# Patient Record
Sex: FEMALE | Race: ASIAN | NOT HISPANIC OR LATINO
[De-identification: names, ages, dates, MRNs, and addresses within clinical notes are randomized per-mention and may not be internally consistent; named-entity substitution may affect disease eponyms.]

---

## 2023-03-14 ENCOUNTER — TRANSCRIPTION ENCOUNTER (OUTPATIENT)
Age: 23
End: 2023-03-14

## 2023-03-14 ENCOUNTER — INPATIENT (INPATIENT)
Facility: HOSPITAL | Age: 23
LOS: 0 days | Discharge: HOME | DRG: 316 | End: 2023-03-14
Attending: HOSPITALIST | Admitting: HOSPITALIST
Payer: COMMERCIAL

## 2023-03-14 VITALS
SYSTOLIC BLOOD PRESSURE: 119 MMHG | WEIGHT: 149.03 LBS | RESPIRATION RATE: 16 BRPM | TEMPERATURE: 98 F | HEART RATE: 87 BPM | DIASTOLIC BLOOD PRESSURE: 74 MMHG | OXYGEN SATURATION: 100 % | HEIGHT: 67 IN

## 2023-03-14 VITALS
HEART RATE: 62 BPM | DIASTOLIC BLOOD PRESSURE: 55 MMHG | SYSTOLIC BLOOD PRESSURE: 108 MMHG | RESPIRATION RATE: 18 BRPM | OXYGEN SATURATION: 96 %

## 2023-03-14 DIAGNOSIS — R07.9 CHEST PAIN, UNSPECIFIED: ICD-10-CM

## 2023-03-14 LAB
A1C WITH ESTIMATED AVERAGE GLUCOSE RESULT: 5.7 % — HIGH (ref 4–5.6)
ALBUMIN SERPL ELPH-MCNC: 4 G/DL — SIGNIFICANT CHANGE UP (ref 3.4–5)
ALBUMIN SERPL ELPH-MCNC: 4.5 G/DL — SIGNIFICANT CHANGE UP (ref 3.3–5)
ALP SERPL-CCNC: 43 U/L — SIGNIFICANT CHANGE UP (ref 40–120)
ALP SERPL-CCNC: 51 U/L — SIGNIFICANT CHANGE UP (ref 40–120)
ALT FLD-CCNC: 15 U/L — SIGNIFICANT CHANGE UP (ref 10–45)
ALT FLD-CCNC: 24 U/L — SIGNIFICANT CHANGE UP (ref 12–42)
ANION GAP SERPL CALC-SCNC: 11 MMOL/L — SIGNIFICANT CHANGE UP (ref 5–17)
ANION GAP SERPL CALC-SCNC: 6 MMOL/L — LOW (ref 9–16)
AST SERPL-CCNC: 23 U/L — SIGNIFICANT CHANGE UP (ref 10–40)
AST SERPL-CCNC: 25 U/L — SIGNIFICANT CHANGE UP (ref 15–37)
BASOPHILS # BLD AUTO: 0.02 K/UL — SIGNIFICANT CHANGE UP (ref 0–0.2)
BASOPHILS NFR BLD AUTO: 0.3 % — SIGNIFICANT CHANGE UP (ref 0–2)
BILIRUB SERPL-MCNC: 0.3 MG/DL — SIGNIFICANT CHANGE UP (ref 0.2–1.2)
BILIRUB SERPL-MCNC: 0.4 MG/DL — SIGNIFICANT CHANGE UP (ref 0.2–1.2)
BUN SERPL-MCNC: 10 MG/DL — SIGNIFICANT CHANGE UP (ref 7–23)
BUN SERPL-MCNC: 8 MG/DL — SIGNIFICANT CHANGE UP (ref 7–23)
CALCIUM SERPL-MCNC: 9 MG/DL — SIGNIFICANT CHANGE UP (ref 8.5–10.5)
CALCIUM SERPL-MCNC: 9.6 MG/DL — SIGNIFICANT CHANGE UP (ref 8.4–10.5)
CHLORIDE SERPL-SCNC: 106 MMOL/L — SIGNIFICANT CHANGE UP (ref 96–108)
CHLORIDE SERPL-SCNC: 106 MMOL/L — SIGNIFICANT CHANGE UP (ref 96–108)
CHOLEST SERPL-MCNC: 194 MG/DL — SIGNIFICANT CHANGE UP
CK MB CFR SERPL CALC: 2.4 NG/ML — SIGNIFICANT CHANGE UP (ref 0–6.7)
CK SERPL-CCNC: 166 U/L — SIGNIFICANT CHANGE UP (ref 25–170)
CK SERPL-CCNC: 228 U/L — HIGH (ref 26–192)
CO2 SERPL-SCNC: 22 MMOL/L — SIGNIFICANT CHANGE UP (ref 22–31)
CO2 SERPL-SCNC: 28 MMOL/L — SIGNIFICANT CHANGE UP (ref 22–31)
CREAT SERPL-MCNC: 0.71 MG/DL — SIGNIFICANT CHANGE UP (ref 0.5–1.3)
CREAT SERPL-MCNC: 0.81 MG/DL — SIGNIFICANT CHANGE UP (ref 0.5–1.3)
CRP SERPL-MCNC: <3 MG/L — SIGNIFICANT CHANGE UP (ref 0–4)
D DIMER BLD IA.RAPID-MCNC: 332 NG/ML DDU — HIGH
EGFR: 106 ML/MIN/1.73M2 — SIGNIFICANT CHANGE UP
EGFR: 124 ML/MIN/1.73M2 — SIGNIFICANT CHANGE UP
EOSINOPHIL # BLD AUTO: 0.07 K/UL — SIGNIFICANT CHANGE UP (ref 0–0.5)
EOSINOPHIL NFR BLD AUTO: 1.2 % — SIGNIFICANT CHANGE UP (ref 0–6)
ERYTHROCYTE [SEDIMENTATION RATE] IN BLOOD: 9 MM/HR — SIGNIFICANT CHANGE UP
ESTIMATED AVERAGE GLUCOSE: 117 MG/DL — HIGH (ref 68–114)
GLUCOSE SERPL-MCNC: 108 MG/DL — HIGH (ref 70–99)
GLUCOSE SERPL-MCNC: 135 MG/DL — HIGH (ref 70–99)
HCG SERPL-ACNC: <1 MIU/ML — SIGNIFICANT CHANGE UP
HCT VFR BLD CALC: 38.1 % — SIGNIFICANT CHANGE UP (ref 34.5–45)
HCT VFR BLD CALC: 38.4 % — SIGNIFICANT CHANGE UP (ref 34.5–45)
HDLC SERPL-MCNC: 89 MG/DL — SIGNIFICANT CHANGE UP
HGB BLD-MCNC: 12.4 G/DL — SIGNIFICANT CHANGE UP (ref 11.5–15.5)
HGB BLD-MCNC: 12.5 G/DL — SIGNIFICANT CHANGE UP (ref 11.5–15.5)
HIV 1 & 2 AB SERPL IA.RAPID: SIGNIFICANT CHANGE UP
IMM GRANULOCYTES NFR BLD AUTO: 0 % — SIGNIFICANT CHANGE UP (ref 0–0.9)
LIPID PNL WITH DIRECT LDL SERPL: 97 MG/DL — SIGNIFICANT CHANGE UP
LYMPHOCYTES # BLD AUTO: 2.41 K/UL — SIGNIFICANT CHANGE UP (ref 1–3.3)
LYMPHOCYTES # BLD AUTO: 40.8 % — SIGNIFICANT CHANGE UP (ref 13–44)
MAGNESIUM SERPL-MCNC: 2 MG/DL — SIGNIFICANT CHANGE UP (ref 1.6–2.6)
MCHC RBC-ENTMCNC: 29.6 PG — SIGNIFICANT CHANGE UP (ref 27–34)
MCHC RBC-ENTMCNC: 29.6 PG — SIGNIFICANT CHANGE UP (ref 27–34)
MCHC RBC-ENTMCNC: 32.3 GM/DL — SIGNIFICANT CHANGE UP (ref 32–36)
MCHC RBC-ENTMCNC: 32.8 GM/DL — SIGNIFICANT CHANGE UP (ref 32–36)
MCV RBC AUTO: 90.3 FL — SIGNIFICANT CHANGE UP (ref 80–100)
MCV RBC AUTO: 91.6 FL — SIGNIFICANT CHANGE UP (ref 80–100)
MONOCYTES # BLD AUTO: 0.35 K/UL — SIGNIFICANT CHANGE UP (ref 0–0.9)
MONOCYTES NFR BLD AUTO: 5.9 % — SIGNIFICANT CHANGE UP (ref 2–14)
NEUTROPHILS # BLD AUTO: 3.05 K/UL — SIGNIFICANT CHANGE UP (ref 1.8–7.4)
NEUTROPHILS NFR BLD AUTO: 51.8 % — SIGNIFICANT CHANGE UP (ref 43–77)
NON HDL CHOLESTEROL: 105 MG/DL — SIGNIFICANT CHANGE UP
NRBC # BLD: 0 /100 WBCS — SIGNIFICANT CHANGE UP (ref 0–0)
NRBC # BLD: 0 /100 WBCS — SIGNIFICANT CHANGE UP (ref 0–0)
PLATELET # BLD AUTO: 168 K/UL — SIGNIFICANT CHANGE UP (ref 150–400)
PLATELET # BLD AUTO: 187 K/UL — SIGNIFICANT CHANGE UP (ref 150–400)
POTASSIUM SERPL-MCNC: 3.8 MMOL/L — SIGNIFICANT CHANGE UP (ref 3.5–5.3)
POTASSIUM SERPL-MCNC: 4.3 MMOL/L — SIGNIFICANT CHANGE UP (ref 3.5–5.3)
POTASSIUM SERPL-SCNC: 3.8 MMOL/L — SIGNIFICANT CHANGE UP (ref 3.5–5.3)
POTASSIUM SERPL-SCNC: 4.3 MMOL/L — SIGNIFICANT CHANGE UP (ref 3.5–5.3)
PROT SERPL-MCNC: 7.6 G/DL — SIGNIFICANT CHANGE UP (ref 6–8.3)
PROT SERPL-MCNC: 7.7 G/DL — SIGNIFICANT CHANGE UP (ref 6.4–8.2)
RBC # BLD: 4.19 M/UL — SIGNIFICANT CHANGE UP (ref 3.8–5.2)
RBC # BLD: 4.22 M/UL — SIGNIFICANT CHANGE UP (ref 3.8–5.2)
RBC # FLD: 12.8 % — SIGNIFICANT CHANGE UP (ref 10.3–14.5)
RBC # FLD: 13 % — SIGNIFICANT CHANGE UP (ref 10.3–14.5)
SARS-COV-2 RNA SPEC QL NAA+PROBE: SIGNIFICANT CHANGE UP
SODIUM SERPL-SCNC: 139 MMOL/L — SIGNIFICANT CHANGE UP (ref 135–145)
SODIUM SERPL-SCNC: 140 MMOL/L — SIGNIFICANT CHANGE UP (ref 132–145)
T4 AB SER-ACNC: 6.24 UG/DL — SIGNIFICANT CHANGE UP (ref 4.5–11.7)
TRIGL SERPL-MCNC: 40 MG/DL — SIGNIFICANT CHANGE UP
TROPONIN I, HIGH SENSITIVITY RESULT: 151.9 NG/L — HIGH
TROPONIN T SERPL-MCNC: <0.01 NG/ML — SIGNIFICANT CHANGE UP (ref 0–0.01)
TSH SERPL-MCNC: 2.79 UIU/ML — SIGNIFICANT CHANGE UP (ref 0.27–4.2)
WBC # BLD: 5.9 K/UL — SIGNIFICANT CHANGE UP (ref 3.8–10.5)
WBC # BLD: 6.87 K/UL — SIGNIFICANT CHANGE UP (ref 3.8–10.5)
WBC # FLD AUTO: 5.9 K/UL — SIGNIFICANT CHANGE UP (ref 3.8–10.5)
WBC # FLD AUTO: 6.87 K/UL — SIGNIFICANT CHANGE UP (ref 3.8–10.5)

## 2023-03-14 PROCEDURE — 71275 CT ANGIOGRAPHY CHEST: CPT | Mod: 26

## 2023-03-14 PROCEDURE — 83735 ASSAY OF MAGNESIUM: CPT

## 2023-03-14 PROCEDURE — 75574 CT ANGIO HRT W/3D IMAGE: CPT

## 2023-03-14 PROCEDURE — 71045 X-RAY EXAM CHEST 1 VIEW: CPT | Mod: 26

## 2023-03-14 PROCEDURE — 84443 ASSAY THYROID STIM HORMONE: CPT

## 2023-03-14 PROCEDURE — 87635 SARS-COV-2 COVID-19 AMP PRB: CPT

## 2023-03-14 PROCEDURE — 84436 ASSAY OF TOTAL THYROXINE: CPT

## 2023-03-14 PROCEDURE — 75574 CT ANGIO HRT W/3D IMAGE: CPT | Mod: 26

## 2023-03-14 PROCEDURE — 93010 ELECTROCARDIOGRAM REPORT: CPT

## 2023-03-14 PROCEDURE — 82553 CREATINE MB FRACTION: CPT

## 2023-03-14 PROCEDURE — 84702 CHORIONIC GONADOTROPIN TEST: CPT

## 2023-03-14 PROCEDURE — 36415 COLL VENOUS BLD VENIPUNCTURE: CPT

## 2023-03-14 PROCEDURE — 93306 TTE W/DOPPLER COMPLETE: CPT

## 2023-03-14 PROCEDURE — 71275 CT ANGIOGRAPHY CHEST: CPT

## 2023-03-14 PROCEDURE — 93306 TTE W/DOPPLER COMPLETE: CPT | Mod: 26

## 2023-03-14 PROCEDURE — 85025 COMPLETE CBC W/AUTO DIFF WBC: CPT

## 2023-03-14 PROCEDURE — 80053 COMPREHEN METABOLIC PANEL: CPT

## 2023-03-14 PROCEDURE — 82550 ASSAY OF CK (CPK): CPT

## 2023-03-14 PROCEDURE — 86140 C-REACTIVE PROTEIN: CPT

## 2023-03-14 PROCEDURE — 85652 RBC SED RATE AUTOMATED: CPT

## 2023-03-14 PROCEDURE — 99223 1ST HOSP IP/OBS HIGH 75: CPT

## 2023-03-14 PROCEDURE — 99285 EMERGENCY DEPT VISIT HI MDM: CPT

## 2023-03-14 PROCEDURE — 83036 HEMOGLOBIN GLYCOSYLATED A1C: CPT

## 2023-03-14 PROCEDURE — 93005 ELECTROCARDIOGRAM TRACING: CPT

## 2023-03-14 PROCEDURE — 71045 X-RAY EXAM CHEST 1 VIEW: CPT

## 2023-03-14 PROCEDURE — 84484 ASSAY OF TROPONIN QUANT: CPT

## 2023-03-14 PROCEDURE — 85027 COMPLETE CBC AUTOMATED: CPT

## 2023-03-14 PROCEDURE — 85379 FIBRIN DEGRADATION QUANT: CPT

## 2023-03-14 PROCEDURE — 80061 LIPID PANEL: CPT

## 2023-03-14 PROCEDURE — 86703 HIV-1/HIV-2 1 RESULT ANTBDY: CPT

## 2023-03-14 RX ORDER — COLCHICINE 0.6 MG
0.6 TABLET ORAL
Refills: 0 | Status: DISCONTINUED | OUTPATIENT
Start: 2023-03-14 | End: 2023-03-14

## 2023-03-14 RX ORDER — METOPROLOL TARTRATE 50 MG
50 TABLET ORAL ONCE
Refills: 0 | Status: COMPLETED | OUTPATIENT
Start: 2023-03-14 | End: 2023-03-14

## 2023-03-14 RX ORDER — IBUPROFEN 200 MG
600 TABLET ORAL THREE TIMES A DAY
Refills: 0 | Status: DISCONTINUED | OUTPATIENT
Start: 2023-03-14 | End: 2023-03-14

## 2023-03-14 RX ORDER — INFLUENZA VIRUS VACCINE 15; 15; 15; 15 UG/.5ML; UG/.5ML; UG/.5ML; UG/.5ML
0.5 SUSPENSION INTRAMUSCULAR ONCE
Refills: 0 | Status: DISCONTINUED | OUTPATIENT
Start: 2023-03-14 | End: 2023-03-14

## 2023-03-14 RX ORDER — COLCHICINE 0.6 MG
1 TABLET ORAL
Qty: 60 | Refills: 0
Start: 2023-03-14 | End: 2023-04-12

## 2023-03-14 RX ORDER — PANTOPRAZOLE SODIUM 20 MG/1
1 TABLET, DELAYED RELEASE ORAL
Qty: 30 | Refills: 0
Start: 2023-03-14 | End: 2023-04-12

## 2023-03-14 RX ORDER — PANTOPRAZOLE SODIUM 20 MG/1
40 TABLET, DELAYED RELEASE ORAL
Refills: 0 | Status: DISCONTINUED | OUTPATIENT
Start: 2023-03-14 | End: 2023-03-14

## 2023-03-14 RX ORDER — IBUPROFEN 200 MG
1 TABLET ORAL
Qty: 21 | Refills: 0
Start: 2023-03-14 | End: 2023-03-20

## 2023-03-14 RX ORDER — IBUPROFEN 200 MG
600 TABLET ORAL
Refills: 0 | Status: DISCONTINUED | OUTPATIENT
Start: 2023-03-14 | End: 2023-03-14

## 2023-03-14 RX ADMIN — PANTOPRAZOLE SODIUM 40 MILLIGRAM(S): 20 TABLET, DELAYED RELEASE ORAL at 14:05

## 2023-03-14 RX ADMIN — Medication 600 MILLIGRAM(S): at 14:11

## 2023-03-14 RX ADMIN — Medication 50 MILLIGRAM(S): at 09:17

## 2023-03-14 NOTE — ED PROVIDER NOTE - CLINICAL SUMMARY MEDICAL DECISION MAKING FREE TEXT BOX
Patient with chest pain, abnormal EKG. Nature of pain not c/w ACS. However, given abnormal EKG, will check troponin, d-dimer, CXR, reassess.

## 2023-03-14 NOTE — ED PROVIDER NOTE - NS ED ROS FT
Gen: Well-developed, NAD. HEENT: NCAT, mmm   Chest: RRR, nl S1 and S2, no m/r/g. Resp: CTAB, no w/r/r  Abd: nl BS, soft, nt/nd. Ext: Warm, dry  Neuro: CN II-XII intact, normal and equal strength, sensation, and reflexes bilaterally, normal gait  Psych: AAOx3

## 2023-03-14 NOTE — DISCHARGE NOTE NURSING/CASE MANAGEMENT/SOCIAL WORK - NSDCPEFALRISK_GEN_ALL_CORE
For information on Fall & Injury Prevention, visit: [email or web address] OR  [email or web address] OR  https://www.cdc.gov/steadi/patient.html

## 2023-03-14 NOTE — DISCHARGE NOTE PROVIDER - NSDCMRMEDTOKEN_GEN_ALL_CORE_FT
colchicine 0.6 mg oral tablet: 1 tab(s) orally 2 times a day  ibuprofen 600 mg oral tablet: 1 tab(s) orally 3 times a day  pantoprazole 40 mg oral delayed release tablet: 1 tab(s) orally once a day (before a meal)

## 2023-03-14 NOTE — ED PROVIDER NOTE - PROGRESS NOTE DETAILS
Elevated d-dimer. Will get CTA chest. Spoke with Dr. Siu from cardiology. accepted patient to his service.

## 2023-03-14 NOTE — H&P ADULT - NSHPLABSRESULTS_GEN_ALL_CORE
12.5   6.87  )-----------( 187      ( 14 Mar 2023 09:19 )             38.1       03-14    140  |  106  |  10  ----------------------------<  135<H>  4.3   |  28  |  0.81    Ca    9.0      14 Mar 2023 00:27    TPro  7.7  /  Alb  4.0  /  TBili  0.3  /  DBili  x   /  AST  25  /  ALT  24  /  AlkPhos  51  03-14          CARDIAC MARKERS ( 14 Mar 2023 01:41 )  x     / x     / 228 U/L / x     / x                EKG: NSR, T inversions V1-V5.

## 2023-03-14 NOTE — ED PROVIDER NOTE - OBJECTIVE STATEMENT
Patient reports that she developed left-sided chest pain below her breast around 8 PM tonight.  Feels like cramping.  No change with exertion.  Feels better with deep breath, lying back, or slouching.  Feels worse with sitting up straight.  Has had pain like this on and off in the past that usually last a few minutes and goes away with stretching but went to urgent care [facility] because the pain persisted had an abnormal EKG and was sent to the ED.  No fever, shortness of breath, abdominal pain, nausea, vomiting, diarrhea, diaphoresis.  Patient reports she had palpitations when she was 18 years old.  Saw a cardiologist who did a 48-hour Holter monitor and an echocardiogram.  Told that everything was normal.  Does not have a copy of her old EKG.

## 2023-03-14 NOTE — ED PROVIDER NOTE - CARE PLAN
Principal Discharge DX:	Chest pain   1 Principal Discharge DX:	Chest pain  Secondary Diagnosis:	Abnormal cardiac enzyme level

## 2023-03-14 NOTE — DISCHARGE NOTE PROVIDER - CARE PROVIDER_API CALL
Berna Tapia)  Cardiology  [address] [address]  Phone: [phone]  Fax: [phone]  Follow Up Time: 2 weeks

## 2023-03-14 NOTE — DISCHARGE NOTE NURSING/CASE MANAGEMENT/SOCIAL WORK - PATIENT PORTAL LINK FT
You can access the FollowMyHealth Patient Portal offered by [facility] by registering at the following website: [email or web address]. By joining [employer]’s FollowMyHealth portal, you will also be able to view your health information using other applications (apps) compatible with our system.

## 2023-03-14 NOTE — H&P ADULT - ASSESSMENT
Patient is a 21 y/o F with no significant PMHx who presented to the urgent care with left sided chest pain below her breast  x 1 day. Noted to have ECG with T inversions in V1-V5, abnormal Troponin I high sensitivity 151.9 and  at [facility] with eventual transfer to [facility] for further ischemic evaluation with CCTA and echo.

## 2023-03-14 NOTE — ED ADULT NURSE NOTE - CHIEF COMPLAINT QUOTE
female patient here for eval of left sided chest pain since 2000 tonight. patient referred here from [facility] MD for eval of T inversions in V-1,2,3,4,5. patient reports similar incident in 2022 and wore a holter monitor for PVC. patient reports heart murmur diagnosis as a child but denies any other PMH

## 2023-03-14 NOTE — DISCHARGE NOTE PROVIDER - HOSPITAL COURSE
Patient is a 23 y/o F with no significant PMHx who presented to the urgent care with left sided chest pain below her breast  x 1 day. Was reported to have abnormal EKG prompting ER visit at [facility]. On arrival VS /74, HR 87, RR 16, Temp 98.5F, O2 100% on RA, ECG with T inversions in V1-V5, abnormal Troponin I high sensitivity 151.9 and , CXR wnl. Also noted to have elevated D-dimer 332 although CTA negative study for PE.  Patient transferred to [facility] for ischemic evaluation in setting of unstable angina.    On arrival here, patient reports ongoing intermittent left sided sharp/cramp-like chest pain with severity 6/10, somewhat positional in nature, and started 8 pm last night when she went out to eat dinner. Each episode can last 5-10 seconds. Sxs worsen with sitting up and taking a deep breath and improve with laying down and stretching. At times she can improve sxs with taking a full deep breath as well. Reports she has had similar sensation of chest discomfort in the past as well (approx 3 times a year) although this episode is lasting longer which prompted her further evaluation.  Denies any syncope, presyncope, palpitations, edema, orthopnea, SOB, any recent cough/cold, or recent sick contacts.     Of note, patient states reports seeing a cardiologist in [place] 2.5 yrs ago for palpitations evaluated with holter and echo stated to be unremarkable besides PVC's.     Patient underwent echo ( ____), CCTA (____), EKG with NSR and T inversions in V1-V5 . Patient labs significant for  ____. Given her symptomatology and cardiac testing results including EKG findings, she will be treated for pericarditis with Ibuprofen 600mg po tid x 7 days, colchicine 0.6mg po bid x 1 month, protonix 40mg po qd x 7 days per MD discussion.     No significant events on telemetry. Repeat EKG without ischemic changes. Patient has been medically cleared for discharge as per Dr. Marley. Patient has been given appropriate discharge instructions including medication regimen, access site management and follow up with Dr. Tapia. Medications that patient needs refills on have been e-prescribed to preferred pharmacy.        Patient is a 21 y/o F with no significant PMHx who presented to the urgent care with left sided chest pain below her breast  x 1 day. Was reported to have abnormal EKG prompting ER visit at [facility]. On arrival VS /74, HR 87, RR 16, Temp 98.5F, O2 100% on RA, ECG with T inversions in V1-V5, abnormal Troponin I high sensitivity 151.9 and , CXR wnl. Also noted to have elevated D-dimer 332 although CTA negative study for PE.  Patient transferred to [facility] for ischemic evaluation in setting of unstable angina.    On arrival here, patient reports ongoing intermittent left sided sharp/cramp-like chest pain with severity 6/10, somewhat positional in nature, and started 8 pm last night when she went out to eat dinner. Each episode can last 5-10 seconds. Sxs worsen with sitting up and taking a deep breath and improve with laying down and stretching. At times she can improve sxs with taking a full deep breath as well. Reports she has had similar sensation of chest discomfort in the past as well (approx 3 times a year) although this episode is lasting longer which prompted her further evaluation.  Denies any syncope, presyncope, palpitations, edema, orthopnea, SOB, any recent cough/cold, or recent sick contacts.     Of note, patient states reports seeing a cardiologist in [place] 2.5 yrs ago for palpitations evaluated with holter and echo stated to be unremarkable besides PVC's.     Patient underwent echo with findings of normal LV size & function, severe asymmetric LVH which appears to extend to the apex. No ESTRELLA of MV or gradient across the LVOT gradient noted. Advised to consider alternative modality (cardiac MRI) or limited contrast study for better assessment of apex/to assess for apical hypertropic cardiomyopathy, Her CCTA stated Ca score 0, angiographically normal coronary arteries, EKG with NSR and T inversions in V1-V5 . Patient labs significant for  A1C 5.7, otherwise unremarkable this admission. Given her symptomatology and cardiac testing results including EKG findings, she will be treated for pericarditis with Ibuprofen 600mg po tid x 7 days, colchicine 0.6mg po bid x 1 month, protonix 40mg po qd x 7 days per MD discussion.     No significant events on telemetry. Repeat EKG without ischemic changes. Patient has been medically cleared for discharge as per Dr. Marley. Patient has been given appropriate discharge instructions including medication regimen, access site management and follow up with Dr. Tapia. Medications that patient needs refills on have been e-prescribed to preferred pharmacy.

## 2023-03-14 NOTE — H&P ADULT - PROBLEM SELECTOR PLAN 1
-Abnormal EKG, troponin I high sensitivity, CK, [facility]  -Etiology: ACS vs pericarditis (idiopathic vs viral)  -EKG 3/14/23 with sinus, T inversions V1-V5  -CXR wnl  -CTA chest negative for PE  -Trend cardiac troponins, ESR, CRP, TSH, A1C, lipid  -HOME MEDS: none  -PLAN: In setting of ongoing chest discomfort and abnormal cardiac markers, plan is to consider CCTA and echo for further ischemic evaluation.      Diet:: NPO   DVT PPX: low risk  Dispo: pending ischemic evaluation  Code Status: Full code    Above discussed w/Dr. Marley

## 2023-03-14 NOTE — ED ADULT TRIAGE NOTE - CHIEF COMPLAINT QUOTE
female patient here for eval of left sided chest pain since 2000 tonight. patient referred here from [facility] MD for eval of T inversions in V-1,2,3,4,5. patient reports similar incident in 2022 and wore a holter monitor for PVC. patient reports heart murmur diagnosis as a child but denies any other PMH

## 2023-03-14 NOTE — H&P ADULT - HISTORY OF PRESENT ILLNESS
Patient is a 21 y/o F with no significant PMHx who presented to the urgent care with left sided chest pain below her breast  x 1 day. Was reported to have abnormal EKG prompting ER visit at [facility]. On arrival VS /74, HR 87, RR 16, Temp 98.5F, O2 100% on RA, ECG with T inversions in V1-V5, abnormal Troponin I high sensitivity 151.9 and , CXR wnl. Also noted to have elevated D-dimer 332 although CTA negative study for PE.  Patient transferred to [facility] for ischemic evaluation in setting of unstable angina.    On arrival here, patient reports ongoing intermittent left sided sharp/cramp-like chest pain with severity 6/10, somewhat positional in nature, and started 8 pm last night when she went out to eat dinner. Each episode can last 5-10 seconds. Sxs worsen with sitting up and taking a deep breath and improve with laying down and stretching. At times she can improve sxs with taking a full deep breath as well. Reports she has had similar sensation of chest discomfort in the past as well (approx 3 times a year) although this episode is lasting longer which prompted her further evaluation.  Denies any syncope, presyncope, palpitations, edema, orthopnea, SOB, any recent cough/cold, or recent sick contacts.     Of note, patient states reports seeing a cardiologist in [place] 2.5 yrs ago for palpitations evaluated with holter and echo stated to be unremarkable besides PVC's.

## 2023-03-17 DIAGNOSIS — R07.89 OTHER CHEST PAIN: ICD-10-CM

## 2023-03-17 DIAGNOSIS — I31.9 DISEASE OF PERICARDIUM, UNSPECIFIED: ICD-10-CM

## 2023-03-21 PROBLEM — Z78.9 OTHER SPECIFIED HEALTH STATUS: Chronic | Status: ACTIVE | Noted: 2023-03-14

## 2023-03-21 PROBLEM — Z00.00 ENCOUNTER FOR PREVENTIVE HEALTH EXAMINATION: Status: ACTIVE | Noted: 2023-03-21

## 2023-04-07 ENCOUNTER — APPOINTMENT (OUTPATIENT)
Dept: HEART AND VASCULAR | Facility: CLINIC | Age: 23
End: 2023-04-07
Payer: COMMERCIAL

## 2023-04-07 ENCOUNTER — NON-APPOINTMENT (OUTPATIENT)
Age: 23
End: 2023-04-07

## 2023-04-07 VITALS
TEMPERATURE: 97.9 F | DIASTOLIC BLOOD PRESSURE: 60 MMHG | BODY MASS INDEX: 24.33 KG/M2 | HEIGHT: 67 IN | HEART RATE: 83 BPM | SYSTOLIC BLOOD PRESSURE: 108 MMHG | WEIGHT: 154.98 LBS | OXYGEN SATURATION: 98 %

## 2023-04-07 PROCEDURE — 93000 ELECTROCARDIOGRAM COMPLETE: CPT

## 2023-04-07 PROCEDURE — 99214 OFFICE O/P EST MOD 30 MIN: CPT | Mod: 25

## 2023-04-20 ENCOUNTER — OUTPATIENT (OUTPATIENT)
Dept: OUTPATIENT SERVICES | Facility: HOSPITAL | Age: 23
LOS: 1 days | End: 2023-04-20
Payer: COMMERCIAL

## 2023-04-20 ENCOUNTER — APPOINTMENT (OUTPATIENT)
Dept: MRI IMAGING | Facility: HOSPITAL | Age: 23
End: 2023-04-20

## 2023-04-20 PROCEDURE — 75561 CARDIAC MRI FOR MORPH W/DYE: CPT

## 2023-04-20 PROCEDURE — 75561 CARDIAC MRI FOR MORPH W/DYE: CPT | Mod: 26

## 2023-04-20 PROCEDURE — A9585: CPT

## 2023-04-20 PROCEDURE — A9577: CPT

## 2023-04-21 ENCOUNTER — TRANSCRIPTION ENCOUNTER (OUTPATIENT)
Age: 23
End: 2023-04-21

## 2023-05-12 ENCOUNTER — APPOINTMENT (OUTPATIENT)
Dept: HEART AND VASCULAR | Facility: CLINIC | Age: 23
End: 2023-05-12

## 2023-05-31 NOTE — HISTORY OF PRESENT ILLNESS
[FreeTextEntry1] : 22F with no significant PMHx who presented to the urgent care on 3/14/23 with left sided chest pain below her breast  x 1 day. Was reported to have abnormal EKG prompting ER visit at [facility]. ECG with T inversions in V1-V5, abnormal Troponin I high sensitivity 151.9 and , CXR wnl. Also noted to have elevated D-dimer 332 although CTA negative for PE. Pt transferred to [facility] for ischemic evaluation in setting of unstable angina. On arrival, pt reports ongoing intermittent left sided sharp/cramp-like chest pain with severity 6/10, somewhat positional in nature, and started 8pm last night when she went out to eat dinner. Each episode can last 5-10 seconds. Sxs worsen with sitting up and taking a deep breath and improve with laying down and stretching. Reports she has had similar sensation of chest discomfort in the past as well (approx 3 times a year) although this episode is lasting longer which prompted her further evaluation. Of note, patient states reports seeing a cardiologist in [place] 2.5 yrs ago for palpitations evaluated with holter and echo stated to be unremarkable besides PVC's. \par \par Pt underwent echo with findings of normal LV size & function, severe asymmetric LVH which appears to extend to the apex. No ESTRELLA of MV or gradient across the LVOT gradient noted. Advised to consider alternative modality (cardiac MRI) or limited contrast study for better assessment of apex/to assess for apical hypertropic cardiomyopathy. Her CCTA stated Ca score 0, angiographically normal coronary arteries. Given her symptomatology and cardiac testing results including EKG findings, she will be treated for pericarditis with Ibuprofen 600mg po tid x 7 days, colchicine 0.6mg po bid x 1 month, protonix 40mg po qd x 7 days per MD discussion.\par \par 4/7/23 OV: presents today for post hospital f/u. Reports that the chest pain has resolved and has stopped taking all meds due to stomach upset. \par \par 4/7/23 ECG: atrial rhythm at 74 bpm, NSSTWC, QTc 440\par  \par

## 2023-05-31 NOTE — PHYSICAL EXAM
[Well Developed] : well developed [Well Nourished] : well nourished [No Acute Distress] : no acute distress [Normal Conjunctiva] : normal conjunctiva [Normal Venous Pressure] : normal venous pressure [No Carotid Bruit] : no carotid bruit [Normal S1, S2] : normal S1, S2 [No Rub] : no rub [No Gallop] : no gallop [Clear Lung Fields] : clear lung fields [Good Air Entry] : good air entry [No Respiratory Distress] : no respiratory distress  [Soft] : abdomen soft [Non Tender] : non-tender [No Masses/organomegaly] : no masses/organomegaly [Normal Bowel Sounds] : normal bowel sounds [Normal Gait] : normal gait [No Edema] : no edema [No Cyanosis] : no cyanosis [No Clubbing] : no clubbing [No Varicosities] : no varicosities [No Rash] : no rash [No Skin Lesions] : no skin lesions [Moves all extremities] : moves all extremities [No Focal Deficits] : no focal deficits [Normal Speech] : normal speech [Alert and Oriented] : alert and oriented [Normal memory] : normal memory [Murmur] : murmur [de-identified] : 4/6 systolic ejection murmur LSB 3 ICS with valsalva

## 2023-05-31 NOTE — ASSESSMENT
[FreeTextEntry1] : 1. pericarditis\par - chest pain has resolved\par - reports that she has stopped all meds\par - ed done re importance of taking colchicine x 3 months, can take 0.3 BID\par \par 2. severe LVH\par - 3/14/23 TTE c/w severe asymmetric LVH which appears to extend to the apex\par - IVS 1.7 cm on TTE\par - cardiac mri ordered for further evaluation\par \par 3. PVCs\par - endorses palps\par - Event Monitor ordered\par \par \par RTC 5 weeks via TH

## 2023-10-23 ENCOUNTER — APPOINTMENT (OUTPATIENT)
Dept: HEART AND VASCULAR | Facility: CLINIC | Age: 23
End: 2023-10-23
Payer: COMMERCIAL

## 2023-10-23 DIAGNOSIS — R07.9 CHEST PAIN, UNSPECIFIED: ICD-10-CM

## 2023-10-23 PROCEDURE — 99443: CPT

## 2023-11-10 ENCOUNTER — APPOINTMENT (OUTPATIENT)
Dept: HEART AND VASCULAR | Facility: CLINIC | Age: 23
End: 2023-11-10

## 2023-12-01 ENCOUNTER — APPOINTMENT (OUTPATIENT)
Dept: HEART AND VASCULAR | Facility: CLINIC | Age: 23
End: 2023-12-01
Payer: COMMERCIAL

## 2023-12-01 DIAGNOSIS — I51.7 CARDIOMEGALY: ICD-10-CM

## 2023-12-01 PROCEDURE — 93306 TTE W/DOPPLER COMPLETE: CPT

## 2023-12-06 ENCOUNTER — OUTPATIENT (OUTPATIENT)
Dept: OUTPATIENT SERVICES | Facility: HOSPITAL | Age: 23
LOS: 1 days | End: 2023-12-06
Payer: COMMERCIAL

## 2023-12-06 DIAGNOSIS — I51.7 CARDIOMEGALY: ICD-10-CM

## 2023-12-06 DIAGNOSIS — R07.9 CHEST PAIN, UNSPECIFIED: ICD-10-CM

## 2023-12-06 DIAGNOSIS — I49.3 VENTRICULAR PREMATURE DEPOLARIZATION: ICD-10-CM

## 2023-12-06 PROCEDURE — 93018 CV STRESS TEST I&R ONLY: CPT

## 2023-12-06 PROCEDURE — 93016 CV STRESS TEST SUPVJ ONLY: CPT

## 2023-12-06 PROCEDURE — 93017 CV STRESS TEST TRACING ONLY: CPT

## 2023-12-11 ENCOUNTER — APPOINTMENT (OUTPATIENT)
Dept: HEART AND VASCULAR | Facility: CLINIC | Age: 23
End: 2023-12-11
Payer: COMMERCIAL

## 2023-12-11 DIAGNOSIS — I49.3 VENTRICULAR PREMATURE DEPOLARIZATION: ICD-10-CM

## 2023-12-11 PROCEDURE — 99442: CPT

## 2023-12-12 PROBLEM — I49.3 PVC'S (PREMATURE VENTRICULAR CONTRACTIONS): Status: ACTIVE | Noted: 2023-04-07

## 2023-12-14 NOTE — ASSESSMENT
[FreeTextEntry1] : # severe LVH - 3/14/23 TTE c/w severe asymmetric LVH which appears to extend to the apex - IVS 1.7 cm on TTE - cMRI c/w nl LV size. Hypertrophy of the basal to apical septum and remaining apical segments. The maximal wall thickness is 18 mm in the mid anteroseptum. LV global systolic function is hyperdynamic w/ an EF of 75%. On delayed enhancement imaging, there is patchy enhancement of the mid anteroseptum. - results discussed in detail with pt - ETT c/w strain pattern, no ectopy, pt asymptomatic  - repeat TTE w/ Valsalva pending - repeat Event Monitor, TTE, and ETT q 1 year - repeat cMRI q 2 years  # PVCs - endorses palps - Event Monitor c/w rare PVCs, <1%  - ETT c/w strain pattern, no ectopy/arrhythmias, pt asymptomatic   #pericarditis - resolved  RTC 1 year  Spent 11 minutes on telehealth/phone visit, all questions answered in detail.

## 2023-12-14 NOTE — HISTORY OF PRESENT ILLNESS
[FreeTextEntry1] : 23F with no significant PMHx who presented to the urgent care on 3/14/23 with left sided chest pain below her breast  x 1 day. Was reported to have abnormal EKG prompting ER visit at [facility]. ECG with T inversions in V1-V5, abnormal Troponin I high sensitivity 151.9 and , CXR wnl. Also noted to have elevated D-dimer 332 although CTA negative for PE. Pt transferred to [facility] for ischemic evaluation in setting of unstable angina. On arrival, pt reports ongoing intermittent left sided sharp/cramp-like chest pain with severity 6/10, somewhat positional in nature, and started 8pm last night when she went out to eat dinner. Each episode can last 5-10 seconds. Sxs worsen with sitting up and taking a deep breath and improve with laying down and stretching. Reports she has had similar sensation of chest discomfort in the past as well (approx 3 times a year) although this episode is lasting longer which prompted her further evaluation. Of note, patient states reports seeing a cardiologist in [place] 2.5 yrs ago for palpitations evaluated with holter and echo stated to be unremarkable besides PVC's.   Pt underwent echo with findings of normal LV size & function, severe asymmetric LVH which appears to extend to the apex. No ESTRELLA of MV or gradient across the LVOT gradient noted. Advised to consider alternative modality (cardiac MRI) or limited contrast study for better assessment of apex/to assess for apical hypertropic cardiomyopathy. Her CCTA stated Ca score 0, angiographically normal coronary arteries. Given her symptomatology and cardiac testing results including EKG findings, she will be treated for pericarditis with Ibuprofen 600mg po tid x 7 days, colchicine 0.6mg po bid x 1 month, protonix 40mg po qd x 7 days per MD discussion.  12/11/23 TH: pt return today via TH to disuss ETT and TTE results. Overall feeling well, no new complaints.  10/23/23 TH: pt presents today via TH for results of cardiac MRI and Event Monitor. No new complaints. 4/7/23 OV: presents today for post hospital f/u. Reports that the chest pain has resolved and has stopped taking all meds due to stomach upset.   4/7/23 ECG: atrial rhythm at 74 bpm, NSSTWC, QTc 440

## 2023-12-19 PROBLEM — I51.7 SEVERE LEFT VENTRICULAR HYPERTROPHY: Status: ACTIVE | Noted: 2023-04-07

## 2024-12-06 ENCOUNTER — APPOINTMENT (OUTPATIENT)
Dept: HEART AND VASCULAR | Facility: CLINIC | Age: 24
End: 2024-12-06